# Patient Record
Sex: FEMALE | URBAN - METROPOLITAN AREA
[De-identification: names, ages, dates, MRNs, and addresses within clinical notes are randomized per-mention and may not be internally consistent; named-entity substitution may affect disease eponyms.]

---

## 2022-08-12 ENCOUNTER — WEB ENCOUNTER (OUTPATIENT)
Dept: URBAN - METROPOLITAN AREA CLINIC 27 | Facility: CLINIC | Age: 57
End: 2022-08-12

## 2022-08-18 ENCOUNTER — LAB OUTSIDE AN ENCOUNTER (OUTPATIENT)
Dept: URBAN - METROPOLITAN AREA CLINIC 27 | Facility: CLINIC | Age: 57
End: 2022-08-18

## 2022-08-18 ENCOUNTER — OFFICE VISIT (OUTPATIENT)
Dept: URBAN - METROPOLITAN AREA CLINIC 27 | Facility: CLINIC | Age: 57
End: 2022-08-18
Payer: COMMERCIAL

## 2022-08-18 ENCOUNTER — WEB ENCOUNTER (OUTPATIENT)
Dept: URBAN - METROPOLITAN AREA CLINIC 27 | Facility: CLINIC | Age: 57
End: 2022-08-18

## 2022-08-18 VITALS
HEART RATE: 80 BPM | HEIGHT: 60 IN | WEIGHT: 137 LBS | SYSTOLIC BLOOD PRESSURE: 143 MMHG | BODY MASS INDEX: 26.9 KG/M2 | DIASTOLIC BLOOD PRESSURE: 87 MMHG | TEMPERATURE: 97.3 F

## 2022-08-18 DIAGNOSIS — K21.9 GERD: ICD-10-CM

## 2022-08-18 DIAGNOSIS — R10.13 DYSPEPSIA: ICD-10-CM

## 2022-08-18 DIAGNOSIS — R19.4 CHANGE IN BOWEL HABIT: ICD-10-CM

## 2022-08-18 DIAGNOSIS — R63.4 ABNORMAL WEIGHT LOSS: ICD-10-CM

## 2022-08-18 PROCEDURE — 99204 OFFICE O/P NEW MOD 45 MIN: CPT | Performed by: INTERNAL MEDICINE

## 2022-08-18 PROCEDURE — 99244 OFF/OP CNSLTJ NEW/EST MOD 40: CPT | Performed by: INTERNAL MEDICINE

## 2022-08-18 RX ORDER — OMEPRAZOLE 20 MG/1
1 CAPSULE 30 MINUTES BEFORE MORNING MEAL CAPSULE, DELAYED RELEASE ORAL ONCE A DAY
Qty: 30 | OUTPATIENT
Start: 2022-08-18

## 2022-08-18 NOTE — HPI-TODAY'S VISIT:
This is a 57-year-old female seen in consultation for unexplained weight loss.  She has lost about 10 pounds in the last 6 months.  She otherwise feels fine but she does complain of epigastric bloating and burping.  Occasional reflux using Tums.  Bowel movements are small and frequent.  She had a colonoscopy in 2020 with Dr. Brock which was normal she states.  Last GYN exam over 5 years ago.  Does complain of some abdominal distention.  She has a follow-up ultrasound of the thyroid for nodules.  She is on no medicines.  No family history of colon cancer.

## 2022-08-19 LAB
A/G RATIO: 1
ABSOLUTE BASOPHILS: 28
ABSOLUTE EOSINOPHILS: 118
ABSOLUTE LYMPHOCYTES: 1495
ABSOLUTE MONOCYTES: 517
ABSOLUTE NEUTROPHILS: 2543
ALBUMIN: 4
ALKALINE PHOSPHATASE: 66
ALT (SGPT): 28
AST (SGOT): 26
BASOPHILS: 0.6
BILIRUBIN, TOTAL: 0.5
BUN/CREATININE RATIO: (no result)
BUN: 11
CALCIUM: 9.5
CARBON DIOXIDE, TOTAL: 28
CHLORIDE: 106
CREATININE: 0.76
EGFR: 91
EOSINOPHILS: 2.5
GLOBULIN, TOTAL: 4
GLUCOSE: 97
HEMATOCRIT: 33.1
HEMOGLOBIN: 10.8
LYMPHOCYTES: 31.8
MCH: 25.3
MCHC: 32.6
MCV: 77.5
MONOCYTES: 11
MPV: 9.2
NEUTROPHILS: 54.1
PLATELET COUNT: 355
POTASSIUM: 4.4
PROTEIN, TOTAL: 8
RDW: 14.7
RED BLOOD CELL COUNT: 4.27
SODIUM: 140
TSH: 1.05
WHITE BLOOD CELL COUNT: 4.7

## 2022-09-08 ENCOUNTER — WEB ENCOUNTER (OUTPATIENT)
Dept: URBAN - METROPOLITAN AREA CLINIC 27 | Facility: CLINIC | Age: 57
End: 2022-09-08

## 2022-09-14 ENCOUNTER — LAB OUTSIDE AN ENCOUNTER (OUTPATIENT)
Dept: URBAN - METROPOLITAN AREA CLINIC 27 | Facility: CLINIC | Age: 57
End: 2022-09-14

## 2022-09-14 ENCOUNTER — OFFICE VISIT (OUTPATIENT)
Dept: URBAN - METROPOLITAN AREA CLINIC 27 | Facility: CLINIC | Age: 57
End: 2022-09-14
Payer: COMMERCIAL

## 2022-09-14 VITALS
BODY MASS INDEX: 25.21 KG/M2 | SYSTOLIC BLOOD PRESSURE: 148 MMHG | HEART RATE: 78 BPM | WEIGHT: 137 LBS | HEIGHT: 62 IN | DIASTOLIC BLOOD PRESSURE: 81 MMHG | TEMPERATURE: 97.3 F

## 2022-09-14 DIAGNOSIS — R19.4 CHANGE IN BOWEL HABIT: ICD-10-CM

## 2022-09-14 DIAGNOSIS — K21.9 GERD: ICD-10-CM

## 2022-09-14 DIAGNOSIS — R63.4 ABNORMAL WEIGHT LOSS: ICD-10-CM

## 2022-09-14 DIAGNOSIS — R10.13 DYSPEPSIA: ICD-10-CM

## 2022-09-14 PROCEDURE — 99214 OFFICE O/P EST MOD 30 MIN: CPT | Performed by: INTERNAL MEDICINE

## 2022-09-14 RX ORDER — OMEPRAZOLE 20 MG/1
1 CAPSULE 30 MINUTES BEFORE MORNING MEAL CAPSULE, DELAYED RELEASE ORAL ONCE A DAY
Qty: 30 | Status: ACTIVE | COMMUNITY
Start: 2022-08-18

## 2022-09-14 NOTE — HPI-TODAY'S VISIT:
This is a 57-year-old female seen in follow-up consultation for her bloating reflux and weight loss.  With the addition of the PPI the reflux is resolving.  She continues with bloating.  She has cut out dairy products.  She has lost weight but her weight is now stable.  She has a new finding of lymph nodes in the cervical region of her neck.  She has a history of thyroid nodules.  Dr. Joshi is arranging a CT scan of the neck.  In September 2020 her colonoscopy was normal.  Her anemia is now with hematocrit of 33 she was also noted to have a globulin of 4.  She saw GYN and examination was unrevealing but she is scheduled for a pelvic ultrasound in a few weeks.  Ultrasound of the liver was stable

## 2022-09-15 LAB
ALBUMIN: 4.2
ALPHA-1-GLOBULIN: 0.3
ALPHA-2-GLOBULIN: 0.6
BETA 1 GLOBULIN: 0.5
BETA 2 GLOBULIN: 0.5
FERRITIN, SERUM: 136
FOLATE (FOLIC ACID), SERUM: 10.9
GAMMA GLOBULIN: 1.9
INTERPRETATION: (no result)
IRON BIND.CAP.(TIBC): 248
IRON SATURATION: 14
IRON: 34
PROTEIN, TOTAL: 8
RETICULOCYTE COUNT: 1.2
RETICULOCYTE, ABSOLUTE: (no result)
VITAMIN B12: 775

## 2022-09-16 PROBLEM — 88111009 CHANGE IN BOWEL HABIT: Status: ACTIVE | Noted: 2022-08-18

## 2022-09-30 ENCOUNTER — WEB ENCOUNTER (OUTPATIENT)
Dept: URBAN - METROPOLITAN AREA CLINIC 27 | Facility: CLINIC | Age: 57
End: 2022-09-30

## 2022-10-18 ENCOUNTER — CLAIMS CREATED FROM THE CLAIM WINDOW (OUTPATIENT)
Dept: URBAN - METROPOLITAN AREA MEDICAL CENTER 8 | Facility: MEDICAL CENTER | Age: 57
End: 2022-10-18

## 2022-10-18 ENCOUNTER — CLAIMS CREATED FROM THE CLAIM WINDOW (OUTPATIENT)
Dept: URBAN - METROPOLITAN AREA MEDICAL CENTER 8 | Facility: MEDICAL CENTER | Age: 57
End: 2022-10-18
Payer: COMMERCIAL

## 2022-10-18 DIAGNOSIS — K29.30 CHRONIC SUPERFICIAL GASTRITIS: ICD-10-CM

## 2022-10-18 PROCEDURE — 43239 EGD BIOPSY SINGLE/MULTIPLE: CPT | Performed by: INTERNAL MEDICINE

## 2022-10-18 RX ORDER — OMEPRAZOLE 20 MG/1
1 CAPSULE 30 MINUTES BEFORE MORNING MEAL CAPSULE, DELAYED RELEASE ORAL ONCE A DAY
Qty: 30 | Status: ACTIVE | COMMUNITY
Start: 2022-08-18

## 2022-11-07 ENCOUNTER — WEB ENCOUNTER (OUTPATIENT)
Dept: URBAN - METROPOLITAN AREA CLINIC 27 | Facility: CLINIC | Age: 57
End: 2022-11-07

## 2023-01-03 ENCOUNTER — DASHBOARD ENCOUNTERS (OUTPATIENT)
Age: 58
End: 2023-01-03

## 2023-01-03 ENCOUNTER — OFFICE VISIT (OUTPATIENT)
Dept: URBAN - METROPOLITAN AREA TELEHEALTH 2 | Facility: TELEHEALTH | Age: 58
End: 2023-01-03
Payer: COMMERCIAL

## 2023-01-03 ENCOUNTER — TELEPHONE ENCOUNTER (OUTPATIENT)
Dept: URBAN - METROPOLITAN AREA CLINIC 27 | Facility: CLINIC | Age: 58
End: 2023-01-03

## 2023-01-03 DIAGNOSIS — R10.13 DYSPEPSIA: ICD-10-CM

## 2023-01-03 DIAGNOSIS — K21.9 GERD: ICD-10-CM

## 2023-01-03 DIAGNOSIS — R63.4 ABNORMAL WEIGHT LOSS: ICD-10-CM

## 2023-01-03 PROBLEM — 162031009 DYSPEPSIA: Status: ACTIVE | Noted: 2022-08-18

## 2023-01-03 PROBLEM — 267024001 ABNORMAL WEIGHT LOSS: Status: ACTIVE | Noted: 2022-08-18

## 2023-01-03 PROBLEM — 235595009 GASTROESOPHAGEAL REFLUX DISEASE: Status: ACTIVE | Noted: 2022-08-18

## 2023-01-03 PROCEDURE — 99213 OFFICE O/P EST LOW 20 MIN: CPT | Performed by: INTERNAL MEDICINE

## 2023-01-03 RX ORDER — OMEPRAZOLE 20 MG/1
1 CAPSULE 30 MINUTES BEFORE MORNING MEAL CAPSULE, DELAYED RELEASE ORAL ONCE A DAY
Qty: 30 | Status: ACTIVE | COMMUNITY
Start: 2022-08-18

## 2023-01-03 NOTE — HPI-TODAY'S VISIT:
This is a 57-year-old female seen in consultation via telehealth for her reflux symptoms.  She was also noted to have weight loss as well as iron deficiency.  She underwent colonoscopy with an outside physician in 2020.  Recent upper endoscopy was unrevealing.  She had a CT scan that showed cervical lymph nodes and thyroid nodule.  Both were biopsied and were benign per patient.  Her reflux resolved on omeprazole but she felt she was doing well and stopped the medication and is still doing well.  Her PCP is concerned about the weight loss and has ordered a PET CT scan